# Patient Record
Sex: FEMALE | Race: WHITE
[De-identification: names, ages, dates, MRNs, and addresses within clinical notes are randomized per-mention and may not be internally consistent; named-entity substitution may affect disease eponyms.]

---

## 2018-01-08 ENCOUNTER — HOSPITAL ENCOUNTER (EMERGENCY)
Dept: HOSPITAL 42 - ED | Age: 43
LOS: 1 days | Discharge: HOME | End: 2018-01-09
Payer: MEDICAID

## 2018-01-08 VITALS — RESPIRATION RATE: 18 BRPM | OXYGEN SATURATION: 97 %

## 2018-01-08 DIAGNOSIS — R94.8: ICD-10-CM

## 2018-01-08 DIAGNOSIS — N12: ICD-10-CM

## 2018-01-08 DIAGNOSIS — K76.0: Primary | ICD-10-CM

## 2018-01-08 LAB
ALBUMIN SERPL-MCNC: 4.5 G/DL (ref 3–4.8)
ALBUMIN/GLOB SERPL: 1 {RATIO} (ref 1.1–1.8)
ALT SERPL-CCNC: 19 U/L (ref 7–56)
APPEARANCE UR: (no result)
AST SERPL-CCNC: 33 U/L (ref 14–36)
BACTERIA #/AREA URNS HPF: (no result) /[HPF]
BASE EXCESS BLDV CALC-SCNC: 3.6 MMOL/L (ref 0–2)
BASOPHILS # BLD AUTO: 0.04 K/MM3 (ref 0–2)
BASOPHILS NFR BLD: 0.4 % (ref 0–3)
BILIRUB UR-MCNC: NEGATIVE MG/DL
BUN SERPL-MCNC: 12 MG/DL (ref 7–21)
CALCIUM SERPL-MCNC: 10.2 MG/DL (ref 8.4–10.5)
COLOR UR: YELLOW
EOSINOPHIL # BLD: 0 10*3/UL (ref 0–0.7)
EOSINOPHIL NFR BLD: 0.3 % (ref 1.5–5)
EPI CELLS #/AREA URNS HPF: (no result) /HPF (ref 0–5)
ERYTHROCYTE [DISTWIDTH] IN BLOOD BY AUTOMATED COUNT: 13.3 % (ref 11.5–14.5)
GFR NON-AFRICAN AMERICAN: > 60
GLUCOSE UR STRIP-MCNC: NEGATIVE MG/DL
GRANULOCYTES # BLD: 6.95 10*3/UL (ref 1.4–6.5)
GRANULOCYTES NFR BLD: 74.8 % (ref 50–68)
HGB BLD-MCNC: 12.3 G/DL (ref 12–16)
LEUKOCYTE ESTERASE UR-ACNC: (no result) LEU/UL
LIPASE SERPL-CCNC: 82 U/L (ref 23–300)
LYMPHOCYTES # BLD: 1.7 10*3/UL (ref 1.2–3.4)
LYMPHOCYTES NFR BLD AUTO: 17.7 % (ref 22–35)
MAGNESIUM SERPL-MCNC: 1.8 MG/DL (ref 1.7–2.2)
MCH RBC QN AUTO: 28.2 PG (ref 25–35)
MCHC RBC AUTO-ENTMCNC: 33.2 G/DL (ref 31–37)
MCV RBC AUTO: 84.9 FL (ref 80–105)
MONOCYTES # BLD AUTO: 0.6 10*3/UL (ref 0.1–0.6)
MONOCYTES NFR BLD: 6.8 % (ref 1–6)
PH BLDV: 7.33 [PH] (ref 7.32–7.43)
PH UR STRIP: 6 [PH] (ref 4.7–8)
PLATELET # BLD: 366 10^3/UL (ref 120–450)
PMV BLD AUTO: 9.4 FL (ref 7–11)
PROT UR STRIP-MCNC: NEGATIVE MG/DL
RBC # BLD AUTO: 4.36 10^6/UL (ref 3.5–6.1)
RBC # UR STRIP: (no result) /UL
SP GR UR STRIP: 1.01 (ref 1–1.03)
URINE NITRATE: NEGATIVE
UROBILINOGEN UR STRIP-ACNC: 0.2 E.U./DL
VENOUS BLOOD FIO2: 21 %
VENOUS BLOOD GAS PCO2: 59 (ref 40–60)
VENOUS BLOOD GAS PO2: 21 MM/HG (ref 30–55)
WBC # BLD AUTO: 9.3 10^3/UL (ref 4.5–11)
WBC #/AREA URNS HPF: (no result) /HPF (ref 0–6)

## 2018-01-08 PROCEDURE — 80053 COMPREHEN METABOLIC PANEL: CPT

## 2018-01-08 PROCEDURE — 87086 URINE CULTURE/COLONY COUNT: CPT

## 2018-01-08 PROCEDURE — 96361 HYDRATE IV INFUSION ADD-ON: CPT

## 2018-01-08 PROCEDURE — 81001 URINALYSIS AUTO W/SCOPE: CPT

## 2018-01-08 PROCEDURE — 85025 COMPLETE CBC W/AUTO DIFF WBC: CPT

## 2018-01-08 PROCEDURE — 96365 THER/PROPH/DIAG IV INF INIT: CPT

## 2018-01-08 PROCEDURE — 83690 ASSAY OF LIPASE: CPT

## 2018-01-08 PROCEDURE — 74177 CT ABD & PELVIS W/CONTRAST: CPT

## 2018-01-08 PROCEDURE — 96375 TX/PRO/DX INJ NEW DRUG ADDON: CPT

## 2018-01-08 PROCEDURE — 99285 EMERGENCY DEPT VISIT HI MDM: CPT

## 2018-01-08 PROCEDURE — 83735 ASSAY OF MAGNESIUM: CPT

## 2018-01-08 PROCEDURE — 82803 BLOOD GASES ANY COMBINATION: CPT

## 2018-01-08 RX ADMIN — CEFTRIAXONE STA MLS/HR: 1 INJECTION, SOLUTION INTRAVENOUS at 22:28

## 2018-01-08 RX ADMIN — CEFTRIAXONE STA MLS/HR: 1 INJECTION, SOLUTION INTRAVENOUS at 21:58

## 2018-01-08 NOTE — CT
EXAM:

  CT Abdomen and Pelvis With Intravenous Contrast



EXAM DATE/TIME:

  1/8/2018 7:47 PM



CLINICAL HISTORY:

  42 years old, female; Pain; Abdominal pain; Flank; Other: Bilateral; 

Additional info: Bilateral flank pain



TECHNIQUE:

  Axial computed tomography images of the abdomen and pelvis with intravenous 

contrast.  All CT scans at this facility use one or more dose reduction 

techniques, viz.: automated exposure control; ma/kV adjustment per patient size 

(including targeted exams where dose is matched to indication; i.e. head); or 

iterative reconstruction technique.

  Coronal and sagittal reformatted images were created and reviewed.



CONTRAST:

  100 mL of OMNI administered intravenously.



COMPARISON:

  There are no prior studies for comparison.



FINDINGS:

  Lower thorax:  Heart size is normal. There is dependent atelectasis at the 

lung bases.



 ABDOMEN:

  Liver:  There is fatty infiltration of the liver.

  Gallbladder and bile ducts:  Gallbladder is distended with a small dependent 

stones. Common duct is unremarkable.

  Pancreas:  unremarkable

  Spleen:  unremarkable

  Adrenals:  Right adrenal is unremarkable. There is a small left adrenal 

nodule.

  Kidneys and ureters:   There are small extrarenal pelves bilaterally. There 

is mild proximal and mid ureterectasis. There is mild thickening and 

enhancement of renal pelvic mucosa and proximal ureteral mucosa. There are 

subtle cortical renal perfusion defects bilaterally right greater than left.

  Stomach and bowel:  Stomach is almost completely empty. Rotation is normal. 

Small bowel is mildly distended with fluid and air. Degree of distention 

decreases distally. There is fecalization of the distal and terminal ileum. 

Appendix is not visualized.There is no pericecal inflammation. Colon is 

incompletely distended which limits evaluation.

  Appendix:  See stomach and bowel



 PELVIS:

  Bladder:  Urinary bladder is unremarkable.

  Reproductive:  Uterus and adnexal structures are unremarkable.



 ABDOMEN and PELVIS:

  Intraperitoneal space:  There is no free air or free fluid.

  Bones/joints:  There are no acute osseous abnormalities. There is sclerosis 

at the left sacroiliac joint with joint space narrowing.

  Soft tissues:  unremarkable

  Vasculature:  Vascular structures are unremarkable.

  Lymph nodes:  There is no pathologic adenopathy.



IMPRESSION:  Urinary tract infection and pyelonephritis, no renal or ureteral 

stones; gallstone; fatty liver



Additional findings as described above.

## 2018-01-08 NOTE — ED PDOC
Arrival/HPI





- General


Chief Complaint: Abdominal Pain


Time Seen by Provider: 01/08/18 19:46


Historian: Patient





- History of Present Illness


Narrative History of Present Illness (Text): 





01/08/18 19:49


43 y/o female, no significant pmh, nkda, c/o urinary symptoms x 2 weeks and 

flank pain with fever for the past several days.  Pt. stated that she has been 

having urinary symptoms for the past 2 weeks, started with the flank pain 

bilaterally and fever for the past 2-3 days, no nausea or vomiting, no night 

sweat, no dizziness, no rash, no numbness or tingling, no rash, no other 

medical or psychological complaints. 





Past Medical History





- Provider Review


Nursing Documentation Reviewed: Yes





- Infectious Disease


Hx of Infectious Diseases: None





- Psychiatric


Hx Substance Use: No





- Anesthesia


Hx Anesthesia: No





Family/Social History





- Physician Review


Nursing Documentation Reviewed: Yes


Family/Social History: Unknown Family HX


Smoking Status: Unknown If Ever Smoked


Hx Alcohol Use: No


Hx Substance Use: No





Allergies/Home Meds


Allergies/Adverse Reactions: 


Allergies





No Known Allergies Allergy (Verified 01/08/18 19:38)


 











Review of Systems





- Review of Systems


Constitutional: Fevers.  absent: Fatigue


Eyes: absent: Vision Changes


ENT: absent: Hearing Changes


Respiratory: absent: SOB, Cough


Cardiovascular: absent: Chest Pain


Gastrointestinal: absent: Abdominal Pain, Nausea, Vomiting


Genitourinary Female: Dysuria.  absent: Frequency, Hematuria, Urine Output 

Changes, Vaginal Bleeding, Vaginal Discharge


Musculoskeletal: Back Pain.  absent: Arthralgias, Neck Pain


Skin: absent: Rash, Pruritis


Neurological: absent: Headache, Dizziness


Psychiatric: absent: Anxiety, Depression





Physical Exam


Vital Signs Reviewed: Yes


Vital Signs











  Temp Pulse Resp BP Pulse Ox


 


 01/08/18 22:42  100.7 F H  85  18  96/61 L  97


 


 01/08/18 19:35  100.5 F H  102 H  16  102/65  99


 


 01/08/18 18:10  100.5 F H  102 H  16  102/65  99











Temperature: Febrile


Blood Pressure: Normal


Pulse: Tachycardic


Respiratory Rate: Normal


Appearance: Positive for: Well-Appearing, Non-Toxic


Pain Distress: Severe


Mental Status: Positive for: Alert and Oriented X 3





- Systems Exam


Head: Present: Atraumatic, Normocephalic


Pupils: Present: PERRL


Extroacular Muscles: Present: EOMI


Conjunctiva: Present: Normal


Mouth: Present: Moist Mucous Membranes


Neck: Present: Normal Range of Motion


Respiratory/Chest: Present: Clear to Auscultation, Good Air Exchange.  No: 

Respiratory Distress, Accessory Muscle Use


Cardiovascular: Present: Regular Rate and Rhythm, Normal S1, S2.  No: Murmurs


Abdomen: Present: Normal Bowel Sounds.  No: Tenderness, Distention, Peritoneal 

Signs


Back: Present: Normal Inspection, CVA Tenderness (bilateral).  No: Midline 

Tenderness, Paraspinal Tenderness, Pain with Leg Raise, Decubitus Ulcer


Upper Extremity: Present: Normal Inspection.  No: Cyanosis, Edema


Lower Extremity: Present: Normal Inspection.  No: Edema


Neurological: Present: GCS=15, Speech Normal, Motor Func Grossly Intact, Gait 

Normal, Memory Normal


Skin: Present: Warm, Dry, Normal Color.  No: Rashes


Psychiatric: Present: Alert, Oriented x 3, Normal Insight, Normal Concentration





Medical Decision Making


ED Course and Treatment: 





01/08/18 19:51


-labs/ua


-CT abdomen and pelvis


-IVF/morphine 4mg


-Observe and reassess





01/08/18 22:55


-Lactic acid is 0.6


-UA show +UTI, IV rocephine ordered. 


-Labs show no acute findings, no elevation of wbc, normal bun/creatine level.


-CT abdomen/pelvis show: Urinary tract infection and pyelonephritis, no renal 

or ureteral stones; gallstone; fatty


liver


-Pain resolved, feeling much better.


-Case/labs/radiology result discussed with Dr. Urrutia and reviewed radiology 

result together, Dr. Urrutia suggest to discharge home with oral antibiotic and 

outpatient follow up.


-Discussed with the patient about the side effect of taking levaquin including 

but not limited to prolong QT and possible achilles tendon rupture, pt. 

verbally expressed understanding that she understand and willing to take this 

medication. 


-Discharge home with levaquin, tylenol, follow up with your own pmd and GI/

urologist/nephrologist within 2 days, return to the ER for any new or worsening 

signs or symptoms. 








- Lab Interpretations


Lab Results: 








 01/08/18 20:45 





 01/08/18 20:45 





 Lab Results





01/08/18 20:45: pO2 21 L, VBG pH 7.33, VBG pCO2 59.0, VBG HCO3 31.1 H, VBG 

Total CO2 32.9 H, VBG O2 Sat (Calc) 56.4, VBG Base Excess 3.6 H, VBG Potassium 

3.8, Sodium 139.0, Chloride 103.0, Glucose 93, Lactate 0.6 L, FiO2 21.0, Venous 

Blood Potassium 3.8


01/08/18 20:45: WBC 9.3, RBC 4.36, Hgb 12.3, Hct 37.0, MCV 84.9, MCH 28.2, MCHC 

33.2, RDW 13.3, Plt Count 366, MPV 9.4, Gran % 74.8 H, Lymph % (Auto) 17.7 L, 

Mono % (Auto) 6.8 H, Eos % (Auto) 0.3 L, Baso % (Auto) 0.4, Gran # 6.95 H, 

Lymph # 1.7, Mono # 0.6, Eos # 0.0, Baso # 0.04


01/08/18 20:45: Sodium 141, Chloride 100, Potassium 3.8, Carbon Dioxide 29, 

Anion Gap 16, BUN 12, Creatinine 0.8, Est GFR (African Amer) > 60, Est GFR (Non-

Af Amer) > 60, Random Glucose 93, Calcium 10.2, Magnesium 1.8, Total Bilirubin 

0.3, AST 33, ALT 19, Alkaline Phosphatase 92, Total Protein 8.8 H, Albumin 4.5, 

Globulin 4.3, Albumin/Globulin Ratio 1.0 L, Lipase 82


01/08/18 20:45: Urine Color Yellow, Urine Appearance Sl cloudy, Urine pH 6.0, 

Ur Specific Gravity 1.010, Urine Protein Negative, Urine Glucose (UA) Negative, 

Urine Ketones Negative, Urine Blood Moderate H, Urine Nitrate Negative, Urine 

Bilirubin Negative, Urine Urobilinogen 0.2, Ur Leukocyte Esterase Large H, 

Urine RBC 5 - 10, Urine WBC Tntc, Ur Epithelial Cells 0 - 2, Urine Bacteria Many








I have reviewed the lab results: Yes


Interpretation: Abnormal lab values (+UTI)





- RAD Interpretation


Radiology Orders: 








01/08/18 19:47


ABDOMEN & PELVIS [ABD & PELVIS IV CONTRAST ONLY] [CT] Stat 








FINDINGS:


Lower thorax: Heart size is normal. There is dependent atelectasis at the lung 

bases.


ABDOMEN:


Liver: There is fatty infiltration of the liver.


Gallbladder and bile ducts: Gallbladder is distended with a small dependent 

stones. Common duct is


unremarkable.


Pancreas: unremarkable


Spleen: unremarkable


Adrenals: Right adrenal is unremarkable. There is a small left adrenal nodule.


PRIYANK BECEKR Accession: [id number] MRN:[id number] | Final Radiology Report


CONFIDENTIALITY STATEMENT


This report is intended only for use by the referring physician, and only in 

accordance with law. If you received this in error, call [phone].


Page 2 of 2


Kidneys and ureters: There are small extrarenal pelves bilaterally. There is 

mild proximal and mid


ureterectasis. There is mild thickening and enhancement of renal pelvic mucosa 

and proximal ureteral


mucosa. There are subtle cortical renal perfusion defects bilaterally right 

greater than left.


Stomach and bowel: Stomach is almost completely empty. Rotation is normal. 

Small bowel is mildly


distended with fluid and air. Degree of distention decreases distally. There is 

fecalization of the distal


and terminal ileum. Appendix is not visualized.There is no pericecal 

inflammation. Colon is


incompletely distended which limits evaluation.


Appendix: See stomach and bowel


PELVIS:


Bladder: Urinary bladder is unremarkable.


Reproductive: Uterus and adnexal structures are unremarkable.


ABDOMEN and PELVIS:


Intraperitoneal space: There is no free air or free fluid.


Bones/joints: There are no acute osseous abnormalities. There is sclerosis at 

the left sacroiliac joint


with joint space narrowing.


Soft tissues: unremarkable


Vasculature: Vascular structures are unremarkable.


Lymph nodes: There is no pathologic adenopathy.


IMPRESSION: Urinary tract infection and pyelonephritis, no renal or ureteral 

stones; gallstone; fatty


liver


Additional findings as described above.


Thank you for allowing us to participate in the care of your patient.


Dictated and Authenticated by: Otilia Perry


: Radiologist





- Medication Orders


Current Medication Orders: 











Discontinued Medications





Acetaminophen (Tylenol 325mg Tab)  650 mg PO STAT STA


   Stop: 01/08/18 22:53


Sodium Chloride (Sodium Chloride 0.9%)  1,000 mls @ 999 mls/hr IV .Q1H1M STA


   Stop: 01/08/18 20:47


   Last Admin: 01/08/18 20:59  Dose: 999 mls/hr





eMAR Start Stop


 Document     01/08/18 20:59  SRE  (Rec: 01/08/18 21:00  SRE  [email or web address])


     Intravenous Solution


      Start Date                                 01/08/18


      Start Time                                 20:45


      End Date                                   01/08/18


      End time                                   21:45


      Total Infusion Time                        60





Ceftriaxone Sodium (Rocephin 1 Gram Ivpb)  1 gm in 100 mls @ 200 mls/hr IVPB 

STAT STA


   PRN Reason: Protocol


   Stop: 01/08/18 21:39


   Last Admin: 01/08/18 22:28  Dose: 200 mls/hr





eMAR Start Stop


 Document     01/08/18 22:28  HEATH  (Rec: 01/08/18 22:28  HEAHT  [email or web address])


     Intravenous Solution


      Start Date                                 01/08/18


      Start Time                                 22:00


      End Date                                   01/08/18


      End time                                   22:28


      Total Infusion Time                        28





Morphine Sulfate (Morphine)  4 mg IVP STAT STA


   Stop: 01/08/18 19:48


   Last Admin: 01/08/18 20:45  Dose: 4 mg





MAR Pain Assessment


 Document     01/08/18 20:45  SRE  (Rec: 01/08/18 21:01  SRE  [email or web address])


     Pain Reassessment


      Is this a pain reassessment?               Yes


     Sleep


      Is patient sleeping during reassessment?   No


     Presence of Pain


      Presence of Pain                           Yes


     Pain Scale Used


      Pain Scale Used                            Numeric


     Location


      Left, Right or Bilateral                   Bilateral


      Pain Location Body Site                    Abdomen


                                                 Back


IVP Administration


 Document     01/08/18 20:45  SRE  (Rec: 01/08/18 21:01  SRE  [email or web address])


     Charges for Administration


      # of IVP Administrations                   1














- PA / NP / Resident Statement


MD/DO has reviewed & agrees with the documentation as recorded.





Disposition/Present on Arrival





- Present on Arrival


Any Indicators Present on Arrival: No


History of DVT/PE: No


History of Uncontrolled Diabetes: No


Urinary Catheter: No


History of Decub. Ulcer: No


History Surgical Site Infection Following: None





- Disposition


Have Diagnosis and Disposition been Completed?: Yes


Diagnosis: 


 UTI (urinary tract infection), Pyelonephritis, Fatty liver, Abnormal CT scan





Disposition: HOME/ ROUTINE


Disposition Time: 22:56


Patient Plan: Discharge


Patient Problems: 


 Current Active Problems











Problem Status Onset


 


Abnormal CT scan Acute  


 


Fatty liver Acute  


 


Pyelonephritis Acute  


 


UTI (urinary tract infection) Acute  











Condition: IMPROVED


Discharge Instructions (ExitCare):  Urinary Tract Infection in Women (ED)


Print Language: ENGLISH


Additional Instructions: 


-Discharge home with levaquin, tylenol, follow up with your own pmd and GI/

urologist/nephrologist within 2 days, return to the ER for any new or worsening 

signs or symptoms. 


Prescriptions: 


Acetaminophen [Tylenol 325mg tab] 2 tab PO QID PRN #30 tab


 PRN Reason: Other


levoFLOXacin [Levaquin] 750 mg PO DAILY #5 tab


Phenazopyridine [Phenazopyridine HCl] 200 mg PO TID #6 tab


Referrals: 


Zack Chery MD [Staff Provider] - Follow up with primary


Moiz Mathias MD [Staff Provider] - Follow up with primary


Suresh Martini MD [Staff Provider] - Follow up with primary


Forms:  [employer] (English), WORK NOTE

## 2018-01-09 VITALS — HEART RATE: 78 BPM | TEMPERATURE: 99.1 F | SYSTOLIC BLOOD PRESSURE: 90 MMHG | DIASTOLIC BLOOD PRESSURE: 55 MMHG
